# Patient Record
Sex: FEMALE | Race: WHITE | Employment: STUDENT | URBAN - METROPOLITAN AREA
[De-identification: names, ages, dates, MRNs, and addresses within clinical notes are randomized per-mention and may not be internally consistent; named-entity substitution may affect disease eponyms.]

---
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
## 2022-05-17 DIAGNOSIS — N93.8 DUB (DYSFUNCTIONAL UTERINE BLEEDING): ICD-10-CM

## 2022-05-18 NOTE — TELEPHONE ENCOUNTER
Attempted to contact patient as she needs appointment for medication check. Voicemail not set up, unable to leave message.

## 2022-05-23 RX ORDER — NORGESTIMATE AND ETHINYL ESTRADIOL 0.25-0.035
KIT ORAL
Qty: 84 TABLET | Refills: 4 | OUTPATIENT
Start: 2022-05-23

## 2024-11-12 ENCOUNTER — OFFICE VISIT (OUTPATIENT)
Age: 24
End: 2024-11-12

## 2024-11-12 VITALS
OXYGEN SATURATION: 96 % | WEIGHT: 118 LBS | TEMPERATURE: 97.2 F | HEIGHT: 64 IN | DIASTOLIC BLOOD PRESSURE: 62 MMHG | BODY MASS INDEX: 20.14 KG/M2 | SYSTOLIC BLOOD PRESSURE: 110 MMHG | HEART RATE: 69 BPM

## 2024-11-12 DIAGNOSIS — M79.672 LEFT FOOT PAIN: ICD-10-CM

## 2024-11-12 DIAGNOSIS — M77.9 TENDONITIS: Primary | ICD-10-CM

## 2024-11-12 RX ORDER — PREDNISONE 10 MG/1
10 TABLET ORAL 2 TIMES DAILY
Qty: 10 TABLET | Refills: 0 | Status: SHIPPED | OUTPATIENT
Start: 2024-11-12 | End: 2024-11-17

## 2024-11-12 ASSESSMENT — ENCOUNTER SYMPTOMS
VOMITING: 0
NAUSEA: 0

## 2024-11-12 NOTE — PROGRESS NOTES
Ryland Weinberg (:  2000) is a 24 y.o. female,New patient, here for evaluation of the following chief complaint(s):  Foot Pain (Left foot pain/Top middle of foot/Pt states it started hurting Friday but denies injury to foot )      ASSESSMENT/PLAN:    ICD-10-CM    1. Tendonitis  M77.9 predniSONE (DELTASONE) 10 MG tablet      2. Left foot pain  M79.672 XR FOOT LEFT (MIN 3 VIEWS)          Initial Xray read: negative.     Rest, elevate foot as much as possible.   Wear supportive shoes.     IF no improvement to foot next step is to follow up with Orthopedics.     SUBJECTIVE/OBJECTIVE:    History provided by:  Patient   used: No    Foot Pain   The current episode started in the past 7 days. There has been no history of extremity trauma. The problem occurs constantly. The problem has been waxing and waning. Pertinent negatives include no inability to bear weight, joint swelling, limited range of motion or numbness. The symptoms are aggravated by activity. She has tried nothing for the symptoms.     HPI:   24 y.o. female presents with symptoms of left foot pain ongoing since the last 3 days. Denies injury to foot, prior foot issues,inability to walk. Has taken nothing for symptoms so far. Patient is a runner and did run a half marathon 2 weeks ago. Typically does run 3 miles a day. Has not increased or changed her usual running or activities. Does not have new or old shoes.     Vitals:    24 1634   BP: 110/62   Pulse: 69   Temp: 97.2 °F (36.2 °C)   TempSrc: Infrared   SpO2: 96%   Weight: 53.5 kg (118 lb)   Height: 1.626 m (5' 4\")       Review of Systems   Gastrointestinal:  Negative for nausea and vomiting.   Musculoskeletal:  Negative for myalgias.   Skin:  Negative for rash and wound.   Neurological:  Negative for numbness.       Physical Exam  Constitutional:       General: She is not in acute distress.     Appearance: Normal appearance.   Eyes:      Pupils: Pupils are equal, round,

## 2024-11-12 NOTE — PATIENT INSTRUCTIONS
Initial Xray read: negative.     Rest, elevate foot as much as possible.   Wear supportive shoes.     IF no improvement to foot next step is to follow up with Orthopedics.